# Patient Record
Sex: MALE | NOT HISPANIC OR LATINO | Employment: FULL TIME | URBAN - METROPOLITAN AREA
[De-identification: names, ages, dates, MRNs, and addresses within clinical notes are randomized per-mention and may not be internally consistent; named-entity substitution may affect disease eponyms.]

---

## 2022-01-30 ENCOUNTER — OFFICE VISIT (OUTPATIENT)
Dept: URGENT CARE | Facility: CLINIC | Age: 42
End: 2022-01-30
Payer: COMMERCIAL

## 2022-01-30 VITALS
OXYGEN SATURATION: 97 % | SYSTOLIC BLOOD PRESSURE: 126 MMHG | TEMPERATURE: 100 F | HEIGHT: 70 IN | RESPIRATION RATE: 15 BRPM | HEART RATE: 116 BPM | DIASTOLIC BLOOD PRESSURE: 91 MMHG | BODY MASS INDEX: 22.9 KG/M2 | WEIGHT: 160 LBS

## 2022-01-30 DIAGNOSIS — J01.90 ACUTE NON-RECURRENT SINUSITIS, UNSPECIFIED LOCATION: ICD-10-CM

## 2022-01-30 DIAGNOSIS — R05.9 COUGH: Primary | ICD-10-CM

## 2022-01-30 LAB
CTP QC/QA: YES
SARS-COV-2 RDRP RESP QL NAA+PROBE: NEGATIVE

## 2022-01-30 PROCEDURE — 3074F SYST BP LT 130 MM HG: CPT | Mod: CPTII,S$GLB,, | Performed by: EMERGENCY MEDICINE

## 2022-01-30 PROCEDURE — 3080F PR MOST RECENT DIASTOLIC BLOOD PRESSURE >= 90 MM HG: ICD-10-PCS | Mod: CPTII,S$GLB,, | Performed by: EMERGENCY MEDICINE

## 2022-01-30 PROCEDURE — 3008F PR BODY MASS INDEX (BMI) DOCUMENTED: ICD-10-PCS | Mod: CPTII,S$GLB,, | Performed by: EMERGENCY MEDICINE

## 2022-01-30 PROCEDURE — 99203 OFFICE O/P NEW LOW 30 MIN: CPT | Mod: S$GLB,CS,, | Performed by: EMERGENCY MEDICINE

## 2022-01-30 PROCEDURE — 1160F PR REVIEW ALL MEDS BY PRESCRIBER/CLIN PHARMACIST DOCUMENTED: ICD-10-PCS | Mod: CPTII,S$GLB,, | Performed by: EMERGENCY MEDICINE

## 2022-01-30 PROCEDURE — 1160F RVW MEDS BY RX/DR IN RCRD: CPT | Mod: CPTII,S$GLB,, | Performed by: EMERGENCY MEDICINE

## 2022-01-30 PROCEDURE — U0002: ICD-10-PCS | Mod: QW,S$GLB,, | Performed by: EMERGENCY MEDICINE

## 2022-01-30 PROCEDURE — 3080F DIAST BP >= 90 MM HG: CPT | Mod: CPTII,S$GLB,, | Performed by: EMERGENCY MEDICINE

## 2022-01-30 PROCEDURE — 1159F PR MEDICATION LIST DOCUMENTED IN MEDICAL RECORD: ICD-10-PCS | Mod: CPTII,S$GLB,, | Performed by: EMERGENCY MEDICINE

## 2022-01-30 PROCEDURE — 3074F PR MOST RECENT SYSTOLIC BLOOD PRESSURE < 130 MM HG: ICD-10-PCS | Mod: CPTII,S$GLB,, | Performed by: EMERGENCY MEDICINE

## 2022-01-30 PROCEDURE — 99203 PR OFFICE/OUTPT VISIT, NEW, LEVL III, 30-44 MIN: ICD-10-PCS | Mod: S$GLB,CS,, | Performed by: EMERGENCY MEDICINE

## 2022-01-30 PROCEDURE — 3008F BODY MASS INDEX DOCD: CPT | Mod: CPTII,S$GLB,, | Performed by: EMERGENCY MEDICINE

## 2022-01-30 PROCEDURE — U0002 COVID-19 LAB TEST NON-CDC: HCPCS | Mod: QW,S$GLB,, | Performed by: EMERGENCY MEDICINE

## 2022-01-30 PROCEDURE — 1159F MED LIST DOCD IN RCRD: CPT | Mod: CPTII,S$GLB,, | Performed by: EMERGENCY MEDICINE

## 2022-01-30 RX ORDER — DOXYCYCLINE HYCLATE 100 MG
100 TABLET ORAL 2 TIMES DAILY
Qty: 10 TABLET | Refills: 0 | Status: SHIPPED | OUTPATIENT
Start: 2022-01-30 | End: 2022-02-04

## 2022-01-30 RX ORDER — PREDNISONE 20 MG/1
TABLET ORAL
Qty: 9 TABLET | Refills: 0 | Status: SHIPPED | OUTPATIENT
Start: 2022-01-30 | End: 2022-02-05

## 2022-01-30 NOTE — PROGRESS NOTES
"Subjective:       Patient ID: Shane Rothman is a 41 y.o. male.    Vitals:  height is 5' 10" (1.778 m) and weight is 72.6 kg (160 lb). His oral temperature is 99.6 °F (37.6 °C). His blood pressure is 126/91 (abnormal) and his pulse is 116 (abnormal). His respiration is 15 and oxygen saturation is 97%.     Chief Complaint: Sinus Problem    Pt states he believes he has a sinus infection that has been lingering for a couple of weeks now    Sinus Problem  This is a new problem. The current episode started 1 to 4 weeks ago. The problem is unchanged. The maximum temperature recorded prior to his arrival was 101 - 101.9 F. His pain is at a severity of 0/10. He is experiencing no pain. Associated symptoms include chills, congestion, coughing, shortness of breath, sinus pressure, sneezing and a sore throat. Pertinent negatives include no diaphoresis, ear pain, headaches, hoarse voice, neck pain or swollen glands. Past treatments include acetaminophen. The treatment provided mild relief.       Constitution: Positive for chills. Negative for sweating.   HENT: Positive for congestion, sinus pressure and sore throat. Negative for ear pain.    Neck: Negative for neck pain.   Respiratory: Positive for cough and shortness of breath.    Allergic/Immunologic: Positive for sneezing.   Neurological: Negative for headaches.       Objective:      Physical Exam   Constitutional: He is oriented to person, place, and time. He appears well-developed and well-nourished. He is cooperative.  Non-toxic appearance. He does not have a sickly appearance. He does not appear ill. No distress.   HENT:   Head: Normocephalic and atraumatic.   Ears:   Right Ear: Hearing and external ear normal.   Left Ear: Hearing and external ear normal.   Nose: Nose normal. No mucosal edema, rhinorrhea or nasal deformity. No epistaxis. Right sinus exhibits no maxillary sinus tenderness and no frontal sinus tenderness. Left sinus exhibits no maxillary sinus tenderness and " no frontal sinus tenderness.   Mouth/Throat: Uvula is midline, oropharynx is clear and moist and mucous membranes are normal. Mucous membranes are moist. No trismus in the jaw. Normal dentition. No uvula swelling. No oropharyngeal exudate, posterior oropharyngeal edema or posterior oropharyngeal erythema. Oropharynx is clear.      Comments: Yellow postnasal drip with bilateral erythematous streaks in posterior oropharynx consistent with postnasal drip.  Eyes: Conjunctivae and lids are normal. No scleral icterus.   Neck: Trachea normal and phonation normal. Neck supple. No edema present. No erythema present. No neck rigidity present.   Cardiovascular: Normal rate, regular rhythm, normal heart sounds, intact distal pulses and normal pulses.   Pulmonary/Chest: Effort normal and breath sounds normal. No respiratory distress. He has no decreased breath sounds. He has no wheezes. He has no rhonchi. He has no rales.   Abdominal: Normal appearance.   Musculoskeletal: Normal range of motion.         General: No deformity or edema. Normal range of motion.   Neurological: no focal deficit. He is alert and oriented to person, place, and time. He exhibits normal muscle tone. Coordination normal.   Skin: Skin is warm, dry, intact, not diaphoretic and not pale.   Psychiatric: He has a normal mood and affect. His speech is normal and behavior is normal. Judgment and thought content normal. Cognition and memory  Nursing note and vitals reviewed.    Results for orders placed or performed in visit on 01/30/22   POCT COVID-19 Rapid Screening   Result Value Ref Range    POC Rapid COVID Negative Negative     Acceptable Yes      Patient has been having sinus congestion with postnasal drip for several weeks.  He has no new symptoms.  No fever, no headache, no malaise or fatigue.  His COVID test is negative.  Will cover with short-term steroid and antibiotic.          Assessment:       1. Cough    2. Acute non-recurrent  sinusitis, unspecified location          Plan:         Cough  -     POCT COVID-19 Rapid Screening    Acute non-recurrent sinusitis, unspecified location  -     doxycycline (VIBRA-TABS) 100 MG tablet; Take 1 tablet (100 mg total) by mouth 2 (two) times daily. for 5 days  Dispense: 10 tablet; Refill: 0  -     predniSONE (DELTASONE) 20 MG tablet; Take 1 tablet (20 mg total) by mouth 2 (two) times daily for 3 days, THEN 1 tablet (20 mg total) once daily for 3 days.  Dispense: 9 tablet; Refill: 0

## 2022-01-30 NOTE — PATIENT INSTRUCTIONS
Patient Education       Sinusitis in Adults   The Basics   Written by the doctors and editors at [facility]   What is sinusitis? -- Sinusitis is a condition that can cause a stuffy nose, pain in the face, and discharge (mucus) from the nose. The sinuses are hollow areas in the bones of the face (figure 1). They have a thin lining that normally makes a small amount of mucus. When this lining gets irritated or infected, it swells and makes extra mucus. This causes symptoms.  Sinusitis can occur when a person gets sick with a cold. The germs causing the cold can also infect the sinuses. Many times, a person feels like their cold is getting better. But then they get sinusitis and begin to feel sick again.  What are the symptoms of sinusitis? -- Common symptoms of sinusitis include:  · Stuffy or blocked nose  · Thick white, yellow, or green discharge from the nose  · Pain in the teeth  · Pain or pressure in the face - This often feels worse when a person bends forward.  People with sinusitis can also have other symptoms that include:  · Fever  · Cough  · Trouble smelling  · Ear pressure or fullness  · Headache  · Bad breath  · Feeling tired  Most of the time, symptoms start to improve in 7 to 10 days.  Should I see a doctor or nurse? -- See your doctor or nurse if your symptoms last more than 10 days, or if your symptoms first get better but then get worse.  Rarely, sinusitis can lead to serious problems. See your doctor or nurse right away (do not wait 10 days) if you have:  · Fever higher than 102°F (38.9°C)  · Sudden and severe pain in the face and head  · Trouble seeing or seeing double  · Trouble thinking clearly  · Swelling or redness around one or both eyes  · A stiff neck  Is there anything I can do on my own to feel better? -- Yes. To reduce your symptoms, you can:  · Take an over-the-counter pain reliever to reduce the pain  · Rinse your nose and sinuses with salt water a few times a day - Ask your doctor or  "nurse about the best way to do this.  Your doctor might also prescribe a steroid nose spray to reduce the swelling in your nose. (These kinds of steroid nose sprays are safe to take, and do not contain the same steroids that some athletes take illegally.)  How is sinusitis treated? -- Most of the time, sinusitis does not need to be treated with antibiotic medicines. This is because most sinusitis is caused by viruses, not bacteria, and antibiotics do not kill viruses. In fact, even sinusitis caused by bacteria will usually get better on its own without antibiotics.   Some people with sinusitis do need treatment with antibiotics. If your symptoms have not improved after 10 days, ask your doctor if you should take antibiotics. Your doctor might recommend that you wait 1 more week to see if your symptoms improve. But if you have symptoms such as a fever or a lot of pain, they might prescribe antibiotics. It is important to follow your doctor's instructions about taking your antibiotics.  What if my symptoms do not get better? -- If your symptoms do not get better, talk with your doctor or nurse. They might order tests to figure out why you still have symptoms. These can include:  · CT scan or other imaging tests - Imaging tests create pictures of the inside of the body.  · A test to look inside the sinuses - For this test, a doctor puts a thin tube with a camera on the end into the nose and up into the sinuses.  Some people get a lot of sinus infections or have symptoms that last at least 3 months. These people can have a different type of sinusitis called "chronic sinusitis." Chronic sinusitis can be caused by different things. For example, some people have growths inside their nose or sinuses that are called "polyps." Other people have allergies that cause their symptoms.  Chronic sinusitis can be treated in different ways. If you have chronic sinusitis, talk with your doctor about which treatments are right for " you.  All topics are updated as new evidence becomes available and our peer review process is complete.  This topic retrieved from [employer] on: Sep 21, 2021.  Topic 66537 Version 17.0  Release: 29.4.2 - C29.263  © 2021 UpToDate, Inc. and/or its affiliates. All rights reserved.  figure 1: Sinuses of the face     This drawing shows the sinuses of the face, from the side and front views.  Graphic 716890 Version 1.0    Consumer Information Use and Disclaimer   This information is not specific medical advice and does not replace information you receive from your health care provider. This is only a brief summary of general information. It does NOT include all information about conditions, illnesses, injuries, tests, procedures, treatments, therapies, discharge instructions or life-style choices that may apply to you. You must talk with your health care provider for complete information about your health and treatment options. This information should not be used to decide whether or not to accept your health care provider's advice, instructions or recommendations. Only your health care provider has the knowledge and training to provide advice that is right for you. The use of this information is governed by the [employer] End User License Agreement, available at [email or web address]://[email or web address].[email or web address]/en/solutions/[email or web address]/[email or web address].The use of [employer] content is governed by the [employer] Terms of Use. ©2021 Up[employer]Date, Inc. All rights reserved.  Copyright   © 2021 UpToDate, Inc. and/or its affiliates. All rights reserved.

## 2022-06-03 ENCOUNTER — OFFICE VISIT (OUTPATIENT)
Dept: URGENT CARE | Facility: CLINIC | Age: 42
End: 2022-06-03
Payer: COMMERCIAL

## 2022-06-03 VITALS
SYSTOLIC BLOOD PRESSURE: 116 MMHG | BODY MASS INDEX: 22.9 KG/M2 | HEIGHT: 70 IN | RESPIRATION RATE: 16 BRPM | WEIGHT: 160 LBS | HEART RATE: 87 BPM | DIASTOLIC BLOOD PRESSURE: 80 MMHG | TEMPERATURE: 99 F | OXYGEN SATURATION: 97 %

## 2022-06-03 DIAGNOSIS — M79.89 SWELLING OF RIGHT HAND: ICD-10-CM

## 2022-06-03 DIAGNOSIS — L03.113 CELLULITIS OF RIGHT UPPER EXTREMITY: ICD-10-CM

## 2022-06-03 DIAGNOSIS — T63.481A INSECT STINGS, ACCIDENTAL OR UNINTENTIONAL, INITIAL ENCOUNTER: Primary | ICD-10-CM

## 2022-06-03 PROCEDURE — 3079F PR MOST RECENT DIASTOLIC BLOOD PRESSURE 80-89 MM HG: ICD-10-PCS | Mod: CPTII,S$GLB,, | Performed by: PHYSICIAN ASSISTANT

## 2022-06-03 PROCEDURE — 1159F MED LIST DOCD IN RCRD: CPT | Mod: CPTII,S$GLB,, | Performed by: PHYSICIAN ASSISTANT

## 2022-06-03 PROCEDURE — 3008F PR BODY MASS INDEX (BMI) DOCUMENTED: ICD-10-PCS | Mod: CPTII,S$GLB,, | Performed by: PHYSICIAN ASSISTANT

## 2022-06-03 PROCEDURE — 96372 THER/PROPH/DIAG INJ SC/IM: CPT | Mod: S$GLB,,, | Performed by: FAMILY MEDICINE

## 2022-06-03 PROCEDURE — 99213 OFFICE O/P EST LOW 20 MIN: CPT | Mod: 25,S$GLB,, | Performed by: PHYSICIAN ASSISTANT

## 2022-06-03 PROCEDURE — 3079F DIAST BP 80-89 MM HG: CPT | Mod: CPTII,S$GLB,, | Performed by: PHYSICIAN ASSISTANT

## 2022-06-03 PROCEDURE — 3074F PR MOST RECENT SYSTOLIC BLOOD PRESSURE < 130 MM HG: ICD-10-PCS | Mod: CPTII,S$GLB,, | Performed by: PHYSICIAN ASSISTANT

## 2022-06-03 PROCEDURE — 1160F RVW MEDS BY RX/DR IN RCRD: CPT | Mod: CPTII,S$GLB,, | Performed by: PHYSICIAN ASSISTANT

## 2022-06-03 PROCEDURE — 96372 PR INJECTION,THERAP/PROPH/DIAG2ST, IM OR SUBCUT: ICD-10-PCS | Mod: S$GLB,,, | Performed by: FAMILY MEDICINE

## 2022-06-03 PROCEDURE — 3008F BODY MASS INDEX DOCD: CPT | Mod: CPTII,S$GLB,, | Performed by: PHYSICIAN ASSISTANT

## 2022-06-03 PROCEDURE — 1160F PR REVIEW ALL MEDS BY PRESCRIBER/CLIN PHARMACIST DOCUMENTED: ICD-10-PCS | Mod: CPTII,S$GLB,, | Performed by: PHYSICIAN ASSISTANT

## 2022-06-03 PROCEDURE — 99213 PR OFFICE/OUTPT VISIT, EST, LEVL III, 20-29 MIN: ICD-10-PCS | Mod: 25,S$GLB,, | Performed by: PHYSICIAN ASSISTANT

## 2022-06-03 PROCEDURE — 3074F SYST BP LT 130 MM HG: CPT | Mod: CPTII,S$GLB,, | Performed by: PHYSICIAN ASSISTANT

## 2022-06-03 PROCEDURE — 1159F PR MEDICATION LIST DOCUMENTED IN MEDICAL RECORD: ICD-10-PCS | Mod: CPTII,S$GLB,, | Performed by: PHYSICIAN ASSISTANT

## 2022-06-03 RX ORDER — DOXYCYCLINE HYCLATE 100 MG
100 TABLET ORAL 2 TIMES DAILY
Qty: 20 TABLET | Refills: 0 | Status: SHIPPED | OUTPATIENT
Start: 2022-06-03 | End: 2022-06-13

## 2022-06-03 RX ORDER — BETAMETHASONE SODIUM PHOSPHATE AND BETAMETHASONE ACETATE 3; 3 MG/ML; MG/ML
9 INJECTION, SUSPENSION INTRA-ARTICULAR; INTRALESIONAL; INTRAMUSCULAR; SOFT TISSUE
Status: COMPLETED | OUTPATIENT
Start: 2022-06-03 | End: 2022-06-03

## 2022-06-03 RX ORDER — PREDNISONE 20 MG/1
TABLET ORAL
Qty: 6 TABLET | Refills: 0 | Status: SHIPPED | OUTPATIENT
Start: 2022-06-03 | End: 2022-06-07

## 2022-06-03 RX ORDER — TRIAMCINOLONE ACETONIDE 0.25 MG/G
CREAM TOPICAL 2 TIMES DAILY
Qty: 80 G | Refills: 0 | Status: SHIPPED | OUTPATIENT
Start: 2022-06-03 | End: 2022-06-13

## 2022-06-03 RX ADMIN — BETAMETHASONE SODIUM PHOSPHATE AND BETAMETHASONE ACETATE 9 MG: 3; 3 INJECTION, SUSPENSION INTRA-ARTICULAR; INTRALESIONAL; INTRAMUSCULAR; SOFT TISSUE at 06:06

## 2022-06-03 NOTE — PROGRESS NOTES
"Subjective:       Patient ID: Shane Rothman is a 41 y.o. male.    Vitals:  height is 5' 10" (1.778 m) and weight is 72.6 kg (160 lb). His temperature is 98.5 °F (36.9 °C). His blood pressure is 116/80 and his pulse is 87. His respiration is 16 and oxygen saturation is 97%.     Chief Complaint: Insect Bite    Patient presents to clinic with multiple wasp stings on his right hand and arm. Symptoms include swelling, erythema, and pruritis. Treatment tried include Cortisone cream with no relief. The stings happened earlier today. He reports no history of known anaphylaxis or allergy to wasp/bee venom.  No reported angioedema, rash, stridor, wheeze, shortness of breath, or chest pain.    Insect Bite  This is a new problem. The current episode started today. The problem occurs constantly. The problem has been unchanged. Pertinent negatives include no abdominal pain, chest pain, chills, coughing, fever, nausea, numbness or vomiting.       Constitution: Negative for chills and fever.   HENT: Negative for trouble swallowing.    Cardiovascular: Negative for chest pain.   Respiratory: Negative for cough and shortness of breath.    Gastrointestinal: Negative for abdominal pain, nausea, vomiting, constipation and diarrhea.   Skin: Positive for color change.   Neurological: Negative for numbness and tingling.       Objective:      Physical Exam   Constitutional:  Non-toxic appearance. He does not appear ill. No distress.   HENT:   Head: Normocephalic and atraumatic.   Eyes: Conjunctivae are normal. Right eye exhibits no discharge. Left eye exhibits no discharge. No scleral icterus.   Cardiovascular: Normal rate, regular rhythm and normal heart sounds.   No murmur heard.Exam reveals no gallop and no friction rub.   Pulmonary/Chest: Effort normal and breath sounds normal. No stridor. No respiratory distress. He has no wheezes. He has no rhonchi. He has no rales.   Abdominal: Normal appearance.   Musculoskeletal:        Arms:       " Right hand: He exhibits decreased range of motion and swelling. He exhibits no tenderness, no bony tenderness, normal two-point discrimination, normal capillary refill, no deformity and no laceration. Normal sensation noted. Normal strength noted.   Neurological: He is alert.   Skin: Skin is not diaphoretic.         Assessment:       1. Insect stings, accidental or unintentional, initial encounter    2. Cellulitis of right upper extremity    3. Swelling of right hand        Plan:     no signs of distress or anaphylaxis.  Vital signs stable.  Multiple wasp stings observed with prominent right hand, forearm, and upper extremity swelling.  No signs of retained stinger.  Will cover with steroids to treat swelling and inflammation.  Patient requested a steroid injection.  Discussed risks and benefits and patient is amenable.  A wait and see antibiotic prescribed at this time. Patient verbalized understanding and all of their questions were answered.       Insect stings, accidental or unintentional, initial encounter  -     predniSONE (DELTASONE) 20 MG tablet; Take 1 tablet (20 mg total) by mouth 2 (two) times daily for 2 days, THEN 1 tablet (20 mg total) once daily for 2 days.  Dispense: 6 tablet; Refill: 0  -     betamethasone acetate-betamethasone sodium phosphate injection 9 mg  -     triamcinolone acetonide 0.025% (KENALOG) 0.025 % cream; Apply topically 2 (two) times daily. for 10 days  Dispense: 80 g; Refill: 0    Cellulitis of right upper extremity  -     doxycycline (VIBRA-TABS) 100 MG tablet; Take 1 tablet (100 mg total) by mouth 2 (two) times daily. for 10 days  Dispense: 20 tablet; Refill: 0    Swelling of right hand      Patient Instructions   You were given a steroid injection today. Risks and benefits were discussed with you.   If you have diabetes this injection will raise your blood sugar. This will normalize over the next 2-3 days. Please make sure you monitor you blood sugar accordingly.   This  medication can also increase you blood pressure. Please monitor your blood pressure as discussed.   Please keep in mind that you should not take long term steroids unless prescribed by your physician. You should not exceed 4 steroid injections in a year and if you have multiple injections they should be spaced out adequately. Long term side effects and risks can occur as mentioned at todays visit.       Please follow up with your Primary care provider within 2-5 days if your signs and symptoms have not resolved or worsen.     If your condition worsens or fails to improve we recommend that you receive another evaluation at the emergency room immediately or contact your primary medical clinic to discuss your concerns.   You must understand that you have received an Urgent Care treatment only and that you may be released before all of your medical problems are known or treated. You, the patient, will arrange for follow up care as instructed.     RED FLAGS/WARNING SYMPTOMS DISCUSSED WITH PATIENT THAT WOULD WARRANT EMERGENT MEDICAL ATTENTION. PATIENT VERBALIZED UNDERSTANDING.

## 2022-06-03 NOTE — PATIENT INSTRUCTIONS
You were given a steroid injection today. Risks and benefits were discussed with you.   If you have diabetes this injection will raise your blood sugar. This will normalize over the next 2-3 days. Please make sure you monitor you blood sugar accordingly.   This medication can also increase you blood pressure. Please monitor your blood pressure as discussed.   Please keep in mind that you should not take long term steroids unless prescribed by your physician. You should not exceed 4 steroid injections in a year and if you have multiple injections they should be spaced out adequately. Long term side effects and risks can occur as mentioned at todays visit.       Please follow up with your Primary care provider within 2-5 days if your signs and symptoms have not resolved or worsen.     If your condition worsens or fails to improve we recommend that you receive another evaluation at the emergency room immediately or contact your primary medical clinic to discuss your concerns.   You must understand that you have received an Urgent Care treatment only and that you may be released before all of your medical problems are known or treated. You, the patient, will arrange for follow up care as instructed.     RED FLAGS/WARNING SYMPTOMS DISCUSSED WITH PATIENT THAT WOULD WARRANT EMERGENT MEDICAL ATTENTION. PATIENT VERBALIZED UNDERSTANDING.